# Patient Record
(demographics unavailable — no encounter records)

---

## 2024-10-15 NOTE — HISTORY OF PRESENT ILLNESS
[FreeTextEntry1] : Mrs. Smith presents today without complaints of significant chest pain or shortness of breath. She was recently diagnosed with fibromyalgia and is currently undergoing peripheral nerve block therapy.

## 2024-10-15 NOTE — REASON FOR VISIT
[FreeTextEntry1] : Mrs. Smith is a pleasant, 46-year-old,  female native [place] with a past medical history significant for hypertension, hyperlipidemia, occasional palpitations, as well as lightheadedness / vertigo, who presents for a follow up evaluation.

## 2024-10-15 NOTE — ASSESSMENT
[FreeTextEntry1] : 1. EKG today reveals normal sinus rhythm at 69 bpm. Normal intervals. Nonspecific ST-T wave changes.   2. Hypertension: Blood pressure is borderline controlled at this time. The patient is advised to continue Amlodipine therapy and follow a stricter low-salt diet.   3. Hyperlipidemia: Review of recent blood work demonstrates a total cholesterol of 144, HDL 51, TC/HDL ratio 2.8, LDL 76, and triglycerides 87. The patient appears to be tolerating Repatha therapy well. I have recommended she continue this as well as follow a strict low-fat / low-cholesterol diet. Regular aerobic exercise was discussed as well. If clinically stable, follow up in three to four months.

## 2024-10-15 NOTE — REASON FOR VISIT
[FreeTextEntry1] : Mrs. Smith is a pleasant, 46-year-old,  female native [place] with a past medical history significant for hypertension, hyperlipidemia, occasional palpitations, as well as lightheadedness / vertigo, who presents for a follow up evaluation.

## 2025-01-14 NOTE — HISTORY OF PRESENT ILLNESS
[FreeTextEntry1] : Follow up of a 45 y/o female w/ PMHx of  [de-identified] : KENNETH is a 45 y/o F native of [place] with MHx HTN, HLD, and myalgia  Rheumatology (Dr. Head in [place]) for Dx Dermatomyositis and neurology for evaluation of diffuse myalgia.  chronic muscle pain that made it hard for her to exercise and has made her frequently tired.  Hx Vertigo, use meclizine . She reports took by mistake double dosing MTX last week (total 12 tabs) She is here today with her  and their 7 y/o daughter;

## 2025-01-14 NOTE — HEALTH RISK ASSESSMENT
[Intercurrent hospitalizations] : was admitted to the hospital  [Never (0 pts)] : Never (0 points) [No] : In the past 12 months have you used drugs other than those required for medical reasons? No [No falls in past year] : Patient reported no falls in the past year [Nearly Every Day (3)] : 1.) Little interest or pleasure in doing things? Nearly every day [Several Days (1)] : 6.) Feeling bad about yourself, or that you are a failure, or have let yourself or your family down? Several days [1/2 of Days or More (2)] : 7.) Trouble concentrating on things, such as reading a newspaper or watching television? Half the days or more [Not at All (0)] : 9.) Thoughts that you would be off dead or of hurting yourself in some way? Not at all [Moderate] : Severity of Depression is Moderate [Somewhat Difficult] : How difficult have these problems made it for you to do your work, take care of things at home, or get along with people? Somewhat difficult [PHQ-9 Positive] : PHQ-9 Positive [I have developed a follow-up plan documented below in the note.] : I have developed a follow-up plan documented below in the note. [Time Spent: ___ Minutes] : I spent [unfilled] minutes performing a depression screening for this patient. [With Patient/Caregiver] : , with patient/caregiver [Reviewed no changes] : Reviewed, no changes [Designated Healthcare Proxy] : Designated healthcare proxy [Relationship: ___] : Relationship: [unfilled] [I will adhere to the patient's wishes.] : I will adhere to the patient's wishes. [Never] : Never [0] : 2) Feeling down, depressed, or hopeless: Not at all (0) [PHQ-2 Negative - No further assessment needed] : PHQ-2 Negative - No further assessment needed [de-identified] : hospitalization  [de-identified] : cardiology; neurology; rheumatology; OBGYN [de-identified] : Working out at gym, cardio [de-identified] : home cooked meals, low-fat diet [BSQ1Vutpd] : 0 [[id number]] : 12 [AdvancecareDate] : 06/19/24

## 2025-01-14 NOTE — INTERPRETER SERVICES
[Ad Hoc ] : provided by an ad hoc  [Interpreters_Relationshiptopatient] :  [TWNoteComboBox1] : Dominican

## 2025-01-14 NOTE — CURRENT MEDS
[Takes medication as prescribed] : takes [None] : Patient does not have any barriers to medication adherence [Muscle Relaxants] : muscle relaxants [FreeTextEntry1] : Takes 6 pills methotrexate once every week Also takes OTC folic acid, Mg supplements, collagen

## 2025-01-14 NOTE — PHYSICAL EXAM
[Normal] : normal rate, regular rhythm, normal S1 and S2 and no murmur heard [No Masses] : no abdominal mass palpated [de-identified] : tender URQ

## 2025-01-14 NOTE — ASSESSMENT
[FreeTextEntry1] : #CPE - 06/2024  #HTN  - on amlodipine 5mg well tolerated  #Muscle pain/myalgia with Dx Dermatomyositis - currently on methotrexate 1/week 6 pill and tizanidine  -F/up with Rheumatology Dr Head in [place] -Pt had extra dosing last week> advise for LFT, CBC> pt states will f/up with Rheumatology  #[place] ([place] - Removed 2012 currently on omeprazole  #HLD - currently seeing cardiology, recently statin d/c due to intolerance  #Screening for Depression PHQ-0 -Denies depression> seen by Holistic medicine and reports natural supplemnets and diet feeling much better.

## 2025-01-21 NOTE — REASON FOR VISIT
[FreeTextEntry1] : Mrs. Smith is a pleasant, 47-year-old,  female [place] with a past medical history significant for hypertension, hyperlipidemia, occasional palpitations, as well as lightheadedness / vertigo, who presents for a follow up evaluation.

## 2025-01-21 NOTE — HISTORY OF PRESENT ILLNESS
[FreeTextEntry1] : Mrs. Smith presents today without complaints of chest pain, shortness of breath, or other cardiac symptoms. She has difficulty exercising due to her underlying diffuse fibromyalgia.

## 2025-01-21 NOTE — ASSESSMENT
[FreeTextEntry1] : 1. EKG today reveals normal sinus rhythm at 71 bpm. Normal intervals. No evidence of ischemia.   2. Hypertension: Blood pressure is controlled at this time on current medications. A low-salt diet was discussed.   3. Hyperlipidemia: The patient is noncompliant with PCSK9 inhibition. Recent blood work reveals a total cholesterol of 212, HDL 74, , triglycerides 115. TC/HDL ratio 2.9. I had a lengthy conversation with the patient regarding her PCSK9 inhibitor use. She must take this on a regular basis, and she promises she will do that. We will repeat fasting blood work prior to next office visit in three months.

## 2025-01-21 NOTE — REASON FOR VISIT
[FreeTextEntry1] : Mrs. Smith is a pleasant, 47-year-old,  female [place] with a past medical history significant for hypertension, hyperlipidemia, occasional palpitations, as well as lightheadedness / vertigo, who presents for a follow up evaluation.

## 2025-04-28 NOTE — REASON FOR VISIT
[FreeTextEntry1] : Mrs. Smith is a pleasant, 47-year-old,  female [place] [place] [facility] [place] with a past medical history significant for hypertension, hyperlipidemia, occasional palpitations, as well as lightheadedness / vertigo, who presents for a follow up evaluation.

## 2025-04-28 NOTE — REASON FOR VISIT
[FreeTextEntry1] : Mrs. Smith is a pleasant, 47-year-old,  [place] with a past medical history significant for hypertension, hyperlipidemia, occasional palpitations, as well as lightheadedness / vertigo, who presents for a follow up evaluation.

## 2025-04-28 NOTE — ASSESSMENT
[FreeTextEntry1] : 1. EKG performed today to assess palpitations. EKG today demonstrates normal sinus rhythm at 74bpm. Normal intervals. There are nonspecific ST-T changes.   2. Hypertension: Blood pressure is controlled at this time on current medications. A low salt diet was discussed.   3. Hyperlipidemia: Review of recent blood work demonstrates a total cholesterol of 137, HDL 62, TC/HDL ratio 2.2, LDL 58, triglycerides 83. This represents a marked improvement from previous profiles. The patient is advised to continue Repatha as well as a low fat/low cholesterol diet. We will reassess in 3 months.   4. Palpitations/lightheadedness: The patient's lightheadedness is for the most part vertigo. I have recommended that she see ENT for further evaluation.

## 2025-04-28 NOTE — HISTORY OF PRESENT ILLNESS
[FreeTextEntry1] : Mrs. Smith presents today for follow up evaluation. She states that she is following a strict low fat/low cholesterol diet and has been compliant with PCSK9 inhibitor therapy. The patient does admit to occasional palpitations as well as bouts of vertigo. He has not seen ENT.

## 2025-07-07 NOTE — ASSESSMENT
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 -On Repatha  #Screening for Depression PHQ-0 -Denies depression> seen by Holistic medicine and reports natural supplemnets and diet feeling much better.

## 2025-07-07 NOTE — HEALTH RISK ASSESSMENT
[Intercurrent hospitalizations] : was admitted to the hospital  [Never (0 pts)] : Never (0 points) [No] : In the past 12 months have you used drugs other than those required for medical reasons? No [No falls in past year] : Patient reported no falls in the past year [0] : 2) Feeling down, depressed, or hopeless: Not at all (0) [PHQ-2 Negative - No further assessment needed] : PHQ-2 Negative - No further assessment needed [Nearly Every Day (3)] : 1.) Little interest or pleasure in doing things? Nearly every day [Several Days (1)] : 6.) Feeling bad about yourself, or that you are a failure, or have let yourself or your family down? Several days [1/2 of Days or More (2)] : 7.) Trouble concentrating on things, such as reading a newspaper or watching television? Half the days or more [Not at All (0)] : 9.) Thoughts that you would be off dead or of hurting yourself in some way? Not at all [Moderate] : Severity of Depression is Moderate [Somewhat Difficult] : How difficult have these problems made it for you to do your work, take care of things at home, or get along with people? Somewhat difficult [PHQ-9 Positive] : PHQ-9 Positive [I have developed a follow-up plan documented below in the note.] : I have developed a follow-up plan documented below in the note. [Time Spent: ___ Minutes] : I spent [unfilled] minutes performing a depression screening for this patient. [With Patient/Caregiver] : , with patient/caregiver [Reviewed no changes] : Reviewed, no changes [Designated Healthcare Proxy] : Designated healthcare proxy [Relationship: ___] : Relationship: [unfilled] [I will adhere to the patient's wishes.] : I will adhere to the patient's wishes. [Never] : Never [de-identified] : hospitalization  [de-identified] : cardiology; neurology; rheumatology; OBGYN [de-identified] : Working out at gym, cardio [de-identified] : home cooked meals, low-fat diet [[id number]] : 0 [[id number]] : 12 [AdvancecareDate] : 06/19/24

## 2025-07-07 NOTE — PHYSICAL EXAM
[Normal] : normal rate, regular rhythm, normal S1 and S2 and no murmur heard [No Masses] : no abdominal mass palpated [de-identified] : tender URQ

## 2025-07-07 NOTE — HISTORY OF PRESENT ILLNESS
[FreeTextEntry1] : Follow up  meds refills [de-identified] : KENNETH is a 47 y/o F native of [place] with MHx HTN, HLD, and myalgia  Rheumatology (Dr. Head in [place]) for Dx Dermatomyositis and neurology for evaluation of diffuse myalgia.  chronic muscle pain that made it hard for her to exercise and has made her frequently tired.  Hx Vertigo, use meclizine . She reports took by mistake double dosing MTX last week (total 12 tabs) She is here today with her  and their 7 y/o daughter;